# Patient Record
Sex: MALE | Race: WHITE | Employment: UNEMPLOYED | URBAN - METROPOLITAN AREA
[De-identification: names, ages, dates, MRNs, and addresses within clinical notes are randomized per-mention and may not be internally consistent; named-entity substitution may affect disease eponyms.]

---

## 2017-05-30 ENCOUNTER — OFFICE VISIT (OUTPATIENT)
Dept: FAMILY MEDICINE CLINIC | Facility: CLINIC | Age: 22
End: 2017-05-30

## 2017-05-30 VITALS
WEIGHT: 173 LBS | TEMPERATURE: 99 F | RESPIRATION RATE: 20 BRPM | HEART RATE: 105 BPM | OXYGEN SATURATION: 98 % | BODY MASS INDEX: 24.22 KG/M2 | SYSTOLIC BLOOD PRESSURE: 110 MMHG | DIASTOLIC BLOOD PRESSURE: 70 MMHG | HEIGHT: 71 IN

## 2017-05-30 DIAGNOSIS — J03.90 TONSILLITIS: Primary | ICD-10-CM

## 2017-05-30 PROCEDURE — 99203 OFFICE O/P NEW LOW 30 MIN: CPT | Performed by: FAMILY MEDICINE

## 2017-05-30 RX ORDER — AMOXICILLIN AND CLAVULANATE POTASSIUM 875; 125 MG/1; MG/1
1 TABLET, FILM COATED ORAL 2 TIMES DAILY
Qty: 20 TABLET | Refills: 0 | Status: SHIPPED | OUTPATIENT
Start: 2017-05-30 | End: 2017-06-09

## 2017-05-30 RX ORDER — PENICILLIN V POTASSIUM 500 MG/1
500 TABLET ORAL 4 TIMES DAILY
COMMUNITY
End: 2017-06-03 | Stop reason: ALTCHOICE

## 2017-05-30 RX ORDER — PREDNISONE 20 MG/1
TABLET ORAL
Qty: 15 TABLET | Refills: 0 | Status: SHIPPED | OUTPATIENT
Start: 2017-05-30

## 2017-05-30 NOTE — PROGRESS NOTES
HPI:    Patient ID: Omid Yap is a 19year old male. Sore Throat   This is a new problem. Episode onset: > 1 wk ago. The problem has been unchanged. Maximum temperature: subjective fever. The pain is moderate.  Associated symptoms include abdominal distress. He has no wheezes. He has no rales. Abdominal: Soft. Bowel sounds are normal. He exhibits no distension and no mass. There is no tenderness. There is no rebound and no guarding. Lymphadenopathy:     He has cervical adenopathy.    Skin: He is n

## 2017-06-03 ENCOUNTER — LAB ENCOUNTER (OUTPATIENT)
Dept: LAB | Age: 22
End: 2017-06-03
Attending: FAMILY MEDICINE
Payer: COMMERCIAL

## 2017-06-03 ENCOUNTER — OFFICE VISIT (OUTPATIENT)
Dept: FAMILY MEDICINE CLINIC | Facility: CLINIC | Age: 22
End: 2017-06-03

## 2017-06-03 ENCOUNTER — TELEPHONE (OUTPATIENT)
Dept: FAMILY MEDICINE CLINIC | Facility: CLINIC | Age: 22
End: 2017-06-03

## 2017-06-03 ENCOUNTER — HOSPITAL ENCOUNTER (OUTPATIENT)
Dept: CT IMAGING | Facility: HOSPITAL | Age: 22
Discharge: HOME | End: 2017-06-03
Attending: FAMILY MEDICINE
Payer: COMMERCIAL

## 2017-06-03 VITALS
DIASTOLIC BLOOD PRESSURE: 80 MMHG | HEIGHT: 71 IN | TEMPERATURE: 98 F | WEIGHT: 173 LBS | RESPIRATION RATE: 14 BRPM | SYSTOLIC BLOOD PRESSURE: 110 MMHG | BODY MASS INDEX: 24.22 KG/M2 | HEART RATE: 70 BPM

## 2017-06-03 DIAGNOSIS — J36 TONSILLAR ABSCESS: Primary | ICD-10-CM

## 2017-06-03 DIAGNOSIS — J36 TONSILLAR ABSCESS: ICD-10-CM

## 2017-06-03 PROCEDURE — 36415 COLL VENOUS BLD VENIPUNCTURE: CPT

## 2017-06-03 PROCEDURE — 70491 CT SOFT TISSUE NECK W/DYE: CPT | Performed by: FAMILY MEDICINE

## 2017-06-03 PROCEDURE — 99213 OFFICE O/P EST LOW 20 MIN: CPT | Performed by: FAMILY MEDICINE

## 2017-06-03 PROCEDURE — 80053 COMPREHEN METABOLIC PANEL: CPT

## 2017-06-03 PROCEDURE — 85025 COMPLETE CBC W/AUTO DIFF WBC: CPT

## 2017-06-03 NOTE — PROGRESS NOTES
HPI:    Patient ID: Sylvie Knowles is a 19year old male. Sore Throat   This is a new problem. Episode onset: > 1 wk  The problem has been unchanged. There has been no fever. The pain is severe.  Associated symptoms include swollen glands and trouble sw CBC W Differential W Platelet [E]; Future  - Comp Metabolic Panel (14) [E]; Future  - CT SOFT TISSUE OF NECK(CONTRAST ONLY) (CPT=70491);  Future      Orders Placed This Encounter  CBC W Differential W Platelet [E]  Comp Metabolic Panel (14) [E]    Meds This

## 2017-06-03 NOTE — TELEPHONE ENCOUNTER
Pt called stated he was just here today and tish Eid instructed pt to do a ct scan right away, pt called to make an appt and they won't see him till next Wednesday, pt is under the impression that tish Eid wanted this ct scan soon and right away, pt is a

## 2017-06-03 NOTE — TELEPHONE ENCOUNTER
Pt called stated he was just here today and doc Tanja Bosworth instructed pt to do a ct scan right away, pt called to make an appt and they won't see him till next Wednesday, pt is under the impression that doc Tanja Bosworth wanted this ct scan soon and right away, pt is a

## 2017-06-05 ENCOUNTER — OFFICE VISIT (OUTPATIENT)
Dept: FAMILY MEDICINE CLINIC | Facility: CLINIC | Age: 22
End: 2017-06-05

## 2017-06-05 VITALS
OXYGEN SATURATION: 98 % | BODY MASS INDEX: 24.22 KG/M2 | TEMPERATURE: 98 F | RESPIRATION RATE: 18 BRPM | HEART RATE: 99 BPM | SYSTOLIC BLOOD PRESSURE: 116 MMHG | DIASTOLIC BLOOD PRESSURE: 72 MMHG | WEIGHT: 173 LBS | HEIGHT: 71 IN

## 2017-06-05 DIAGNOSIS — J03.90 TONSILLITIS: Primary | ICD-10-CM

## 2017-06-05 PROCEDURE — 99212 OFFICE O/P EST SF 10 MIN: CPT | Performed by: FAMILY MEDICINE

## 2017-06-05 RX ORDER — AMOXICILLIN AND CLAVULANATE POTASSIUM 875; 125 MG/1; MG/1
1 TABLET, FILM COATED ORAL 2 TIMES DAILY
Qty: 20 TABLET | Refills: 0 | Status: SHIPPED | OUTPATIENT
Start: 2017-06-05 | End: 2017-06-15

## 2017-06-06 NOTE — PROGRESS NOTES
HPI:    Patient ID: Josh You is a 19year old male. Pharyngitis  This is a chronic problem. Episode onset: 2 wks ago. The problem occurs constantly. The problem has been gradually improving.  Associated symptoms comments: Sore throat, trouble swal Friday. Further recs at that time. All this was discussed with pt. No orders of the defined types were placed in this encounter.        Meds This Visit:  Signed Prescriptions Disp Refills    Amoxicillin-Pot Clavulanate 875-125 MG Oral Tab 20 tablet 0

## 2017-06-09 ENCOUNTER — TELEPHONE (OUTPATIENT)
Dept: FAMILY MEDICINE CLINIC | Facility: CLINIC | Age: 22
End: 2017-06-09

## 2017-06-09 DIAGNOSIS — R79.9 ABNORMAL BLOOD CHEMISTRY: Primary | ICD-10-CM

## 2017-06-09 NOTE — TELEPHONE ENCOUNTER
----- Message from Magda Hale DO sent at 6/9/2017  8:34 AM CDT -----  Slightly abnormal CBC with slightly abnormal lymphoid cells. Repeat CBC in 4 weeks. How is he feeling?

## 2017-06-09 NOTE — TELEPHONE ENCOUNTER
Spoke to pt, feeling much better. In the process of packing, moving out of town. Will f/u in new location.

## (undated) NOTE — MR AVS SNAPSHOT
[address]  [address] [facility], [address] [id number] [phone]               Thank you for choosing us for your health care visit with Nallely Rai DO.   We are glad to serve you and happy to provide you with this chaudhry ENT - INTERNAL [[id number] CUSTOM]  Order #:  [id number]         **REFERRAL REQUEST**    Your physician has referred you to a specialist.  Your physician or the clinic staff will provide you with the phone number you should call to schedule your appointment. information, go to [email or web address] and click on the Sign Up Now link in the [employer] box. Enter your [employer] Activation Code exactly as it appears below along with your Zip Code and Date of Birth to complete the sign-up process.  If you do

## (undated) NOTE — LETTER
08/08/17 August 8, 2017        [place] [address]      Dear Audra Koyanagi:    Our records indicate that you have outstanding Lab work that was ordered for you on 6/9/2017 and has not yet been completed.    To provide you with the

## (undated) NOTE — MR AVS SNAPSHOT
[address]  [address] [address] [phone]               Thank you for choosing us for your health care visit with Clyde Rosa DO.   We are glad to serve you and happy to provide you with this chaudhry your doctor or other care provider to review them with you.          Where to Get Your Medications      These medications were sent to [facility] [address], [address] [place] [address] AT [address] [facility], [phone], [address] [place] [address]

## (undated) NOTE — MR AVS SNAPSHOT
[address]  [address] [facility], [address] [address] [phone]               Thank you for choosing us for your health care visit with Skyler Corado DO.   We are glad to serve you and happy to provide you with this chaudhry - Amoxicillin-Pot Clavulanate 875-125 MG Tabs  - predniSONE 20 MG Tabs               Visit [facility] online at  [email or web address]